# Patient Record
Sex: MALE | Race: BLACK OR AFRICAN AMERICAN | URBAN - METROPOLITAN AREA
[De-identification: names, ages, dates, MRNs, and addresses within clinical notes are randomized per-mention and may not be internally consistent; named-entity substitution may affect disease eponyms.]

---

## 2018-11-02 ENCOUNTER — HOSPITAL ENCOUNTER (OUTPATIENT)
Dept: GENERAL RADIOLOGY | Facility: CLINIC | Age: 33
Discharge: HOME | End: 2018-11-02
Attending: EMERGENCY MEDICINE | Admitting: EMERGENCY MEDICINE
Payer: MEDICAID

## 2018-11-02 ENCOUNTER — NURSE TRIAGE (OUTPATIENT)
Dept: NURSING | Facility: CLINIC | Age: 33
End: 2018-11-02

## 2018-11-02 ENCOUNTER — HOSPITAL ENCOUNTER (EMERGENCY)
Facility: CLINIC | Age: 33
Discharge: HOME | End: 2018-11-02
Attending: EMERGENCY MEDICINE | Admitting: EMERGENCY MEDICINE
Payer: MEDICAID

## 2018-11-02 VITALS
HEART RATE: 68 BPM | RESPIRATION RATE: 14 BRPM | OXYGEN SATURATION: 100 % | DIASTOLIC BLOOD PRESSURE: 58 MMHG | BODY MASS INDEX: 26.07 KG/M2 | TEMPERATURE: 97.9 F | SYSTOLIC BLOOD PRESSURE: 120 MMHG | WEIGHT: 188.49 LBS

## 2018-11-02 DIAGNOSIS — R50.9 FEVER, UNKNOWN ORIGIN: ICD-10-CM

## 2018-11-02 DIAGNOSIS — D72.819 LEUKOPENIA, UNSPECIFIED TYPE: ICD-10-CM

## 2018-11-02 DIAGNOSIS — D69.6 THROMBOCYTOPENIA (H): ICD-10-CM

## 2018-11-02 DIAGNOSIS — R05.9 COUGH: ICD-10-CM

## 2018-11-02 DIAGNOSIS — R11.11 VOMITING WITHOUT NAUSEA, INTRACTABILITY OF VOMITING NOT SPECIFIED, UNSPECIFIED VOMITING TYPE: Primary | ICD-10-CM

## 2018-11-02 LAB
ALBUMIN SERPL-MCNC: 3.5 G/DL (ref 3.4–5)
ALBUMIN UR-MCNC: NEGATIVE MG/DL
ALP SERPL-CCNC: 56 U/L (ref 40–150)
ALT SERPL W P-5'-P-CCNC: 35 U/L (ref 0–70)
ANION GAP SERPL CALCULATED.3IONS-SCNC: 6 MMOL/L (ref 3–14)
APPEARANCE CSF: CLEAR
APPEARANCE CSF: CLEAR
APPEARANCE UR: CLEAR
AST SERPL W P-5'-P-CCNC: 41 U/L (ref 0–45)
BASOPHILS # BLD AUTO: 0 10E9/L (ref 0–0.2)
BASOPHILS NFR BLD AUTO: 0.6 %
BILIRUB SERPL-MCNC: 0.4 MG/DL (ref 0.2–1.3)
BILIRUB UR QL STRIP: NEGATIVE
BUN SERPL-MCNC: 8 MG/DL (ref 7–30)
CALCIUM SERPL-MCNC: 8.1 MG/DL (ref 8.5–10.1)
CHLORIDE SERPL-SCNC: 97 MMOL/L (ref 94–109)
CO2 SERPL-SCNC: 28 MMOL/L (ref 20–32)
COLOR CSF: COLORLESS
COLOR CSF: COLORLESS
COLOR UR AUTO: YELLOW
CREAT SERPL-MCNC: 1.08 MG/DL (ref 0.66–1.25)
CRYPTOC AG SPEC QL: NORMAL
DIFFERENTIAL METHOD BLD: ABNORMAL
EOSINOPHIL # BLD AUTO: 0 10E9/L (ref 0–0.7)
EOSINOPHIL NFR BLD AUTO: 0 %
ERYTHROCYTE [DISTWIDTH] IN BLOOD BY AUTOMATED COUNT: 12.2 % (ref 10–15)
EV RNA SPEC QL NAA+PROBE: NEGATIVE
FLUAV+FLUBV AG SPEC QL: NEGATIVE
FLUAV+FLUBV AG SPEC QL: NEGATIVE
GFR SERPL CREATININE-BSD FRML MDRD: 81 ML/MIN/1.7M2
GLUCOSE CSF-MCNC: 65 MG/DL (ref 40–70)
GLUCOSE SERPL-MCNC: 113 MG/DL (ref 70–99)
GLUCOSE UR STRIP-MCNC: NEGATIVE MG/DL
GRAM STN SPEC: NORMAL
HCT VFR BLD AUTO: 39.4 % (ref 40–53)
HGB BLD-MCNC: 13.4 G/DL (ref 13.3–17.7)
HGB UR QL STRIP: NEGATIVE
IMM GRANULOCYTES # BLD: 0 10E9/L (ref 0–0.4)
IMM GRANULOCYTES NFR BLD: 0.6 %
KETONES UR STRIP-MCNC: NEGATIVE MG/DL
LACTATE BLD-SCNC: 1.3 MMOL/L (ref 0.7–2)
LEUKOCYTE ESTERASE UR QL STRIP: NEGATIVE
LIPASE SERPL-CCNC: 82 U/L (ref 73–393)
LYMPHOCYTES # BLD AUTO: 0.4 10E9/L (ref 0.8–5.3)
LYMPHOCYTES NFR BLD AUTO: 21.8 %
Lab: NORMAL
MCH RBC QN AUTO: 30.3 PG (ref 26.5–33)
MCHC RBC AUTO-ENTMCNC: 34 G/DL (ref 31.5–36.5)
MCV RBC AUTO: 89 FL (ref 78–100)
MONOCYTES # BLD AUTO: 0.2 10E9/L (ref 0–1.3)
MONOCYTES NFR BLD AUTO: 10.6 %
NEUTROPHILS # BLD AUTO: 1.2 10E9/L (ref 1.6–8.3)
NEUTROPHILS NFR BLD AUTO: 66.4 %
NITRATE UR QL: NEGATIVE
NRBC # BLD AUTO: 0 10*3/UL
NRBC BLD AUTO-RTO: 0 /100
PH UR STRIP: 6 PH (ref 5–7)
PLATELET # BLD AUTO: 86 10E9/L (ref 150–450)
POTASSIUM SERPL-SCNC: 3.4 MMOL/L (ref 3.4–5.3)
PROT CSF-MCNC: 30 MG/DL (ref 15–60)
PROT SERPL-MCNC: 7.1 G/DL (ref 6.8–8.8)
RBC # BLD AUTO: 4.42 10E12/L (ref 4.4–5.9)
RBC # CSF MANUAL: 1 /UL (ref 0–2)
RBC # CSF MANUAL: NORMAL /UL (ref 0–2)
SODIUM SERPL-SCNC: 131 MMOL/L (ref 133–144)
SOURCE: NORMAL
SP GR UR STRIP: 1.01 (ref 1–1.03)
SPECIMEN SOURCE: NORMAL
TUBE # CSF: 1 #
TUBE # CSF: 4 #
UROBILINOGEN UR STRIP-MCNC: 2 MG/DL (ref 0–2)
WBC # BLD AUTO: 1.8 10E9/L (ref 4–11)
WBC # CSF MANUAL: 0 /UL (ref 0–5)
WBC # CSF MANUAL: NORMAL /UL (ref 0–5)

## 2018-11-02 PROCEDURE — 96360 HYDRATION IV INFUSION INIT: CPT | Mod: 59 | Performed by: EMERGENCY MEDICINE

## 2018-11-02 PROCEDURE — 25000132 ZZH RX MED GY IP 250 OP 250 PS 637: Performed by: EMERGENCY MEDICINE

## 2018-11-02 PROCEDURE — 62270 DX LMBR SPI PNXR: CPT | Performed by: EMERGENCY MEDICINE

## 2018-11-02 PROCEDURE — 87205 SMEAR GRAM STAIN: CPT | Performed by: EMERGENCY MEDICINE

## 2018-11-02 PROCEDURE — 87806 HIV AG W/HIV1&2 ANTB W/OPTIC: CPT | Performed by: EMERGENCY MEDICINE

## 2018-11-02 PROCEDURE — 96361 HYDRATE IV INFUSION ADD-ON: CPT | Performed by: EMERGENCY MEDICINE

## 2018-11-02 PROCEDURE — 83605 ASSAY OF LACTIC ACID: CPT | Performed by: EMERGENCY MEDICINE

## 2018-11-02 PROCEDURE — 87804 INFLUENZA ASSAY W/OPTIC: CPT | Mod: 91 | Performed by: EMERGENCY MEDICINE

## 2018-11-02 PROCEDURE — 84157 ASSAY OF PROTEIN OTHER: CPT | Performed by: EMERGENCY MEDICINE

## 2018-11-02 PROCEDURE — 87389 HIV-1 AG W/HIV-1&-2 AB AG IA: CPT | Performed by: EMERGENCY MEDICINE

## 2018-11-02 PROCEDURE — 87015 SPECIMEN INFECT AGNT CONCNTJ: CPT | Performed by: EMERGENCY MEDICINE

## 2018-11-02 PROCEDURE — 85025 COMPLETE CBC W/AUTO DIFF WBC: CPT | Performed by: EMERGENCY MEDICINE

## 2018-11-02 PROCEDURE — 87798 DETECT AGENT NOS DNA AMP: CPT | Performed by: EMERGENCY MEDICINE

## 2018-11-02 PROCEDURE — 83690 ASSAY OF LIPASE: CPT | Performed by: EMERGENCY MEDICINE

## 2018-11-02 PROCEDURE — 88108 CYTOPATH CONCENTRATE TECH: CPT | Performed by: EMERGENCY MEDICINE

## 2018-11-02 PROCEDURE — 82945 GLUCOSE OTHER FLUID: CPT | Performed by: EMERGENCY MEDICINE

## 2018-11-02 PROCEDURE — 87899 AGENT NOS ASSAY W/OPTIC: CPT | Performed by: EMERGENCY MEDICINE

## 2018-11-02 PROCEDURE — 87040 BLOOD CULTURE FOR BACTERIA: CPT | Performed by: EMERGENCY MEDICINE

## 2018-11-02 PROCEDURE — 99285 EMERGENCY DEPT VISIT HI MDM: CPT | Mod: 25 | Performed by: EMERGENCY MEDICINE

## 2018-11-02 PROCEDURE — 00000102 ZZHCL STATISTIC CYTO WRIGHT STAIN TC: Performed by: EMERGENCY MEDICINE

## 2018-11-02 PROCEDURE — 36415 COLL VENOUS BLD VENIPUNCTURE: CPT | Performed by: EMERGENCY MEDICINE

## 2018-11-02 PROCEDURE — 71046 X-RAY EXAM CHEST 2 VIEWS: CPT

## 2018-11-02 PROCEDURE — 81003 URINALYSIS AUTO W/O SCOPE: CPT | Performed by: EMERGENCY MEDICINE

## 2018-11-02 PROCEDURE — 89050 BODY FLUID CELL COUNT: CPT | Performed by: EMERGENCY MEDICINE

## 2018-11-02 PROCEDURE — 87075 CULTR BACTERIA EXCEPT BLOOD: CPT | Performed by: EMERGENCY MEDICINE

## 2018-11-02 PROCEDURE — 99285 EMERGENCY DEPT VISIT HI MDM: CPT | Mod: Z6 | Performed by: EMERGENCY MEDICINE

## 2018-11-02 PROCEDURE — 87498 ENTEROVIRUS PROBE&REVRS TRNS: CPT | Performed by: EMERGENCY MEDICINE

## 2018-11-02 PROCEDURE — 25000128 H RX IP 250 OP 636: Performed by: EMERGENCY MEDICINE

## 2018-11-02 PROCEDURE — 87102 FUNGUS ISOLATION CULTURE: CPT | Performed by: EMERGENCY MEDICINE

## 2018-11-02 PROCEDURE — 87070 CULTURE OTHR SPECIMN AEROBIC: CPT | Performed by: EMERGENCY MEDICINE

## 2018-11-02 PROCEDURE — 80053 COMPREHEN METABOLIC PANEL: CPT | Performed by: EMERGENCY MEDICINE

## 2018-11-02 RX ORDER — IBUPROFEN 600 MG/1
600 TABLET, FILM COATED ORAL ONCE
Status: COMPLETED | OUTPATIENT
Start: 2018-11-02 | End: 2018-11-02

## 2018-11-02 RX ORDER — ACETAMINOPHEN 500 MG
1000 TABLET ORAL ONCE
Status: COMPLETED | OUTPATIENT
Start: 2018-11-02 | End: 2018-11-02

## 2018-11-02 RX ORDER — SODIUM CHLORIDE 9 MG/ML
1000 INJECTION, SOLUTION INTRAVENOUS CONTINUOUS
Status: DISCONTINUED | OUTPATIENT
Start: 2018-11-02 | End: 2018-11-02 | Stop reason: HOSPADM

## 2018-11-02 RX ORDER — ONDANSETRON 4 MG/1
4 TABLET, ORALLY DISINTEGRATING ORAL EVERY 8 HOURS PRN
Qty: 10 TABLET | Refills: 0 | Status: SHIPPED | OUTPATIENT
Start: 2018-11-02 | End: 2018-11-12

## 2018-11-02 RX ORDER — LIDOCAINE HYDROCHLORIDE AND EPINEPHRINE 10; 10 MG/ML; UG/ML
INJECTION, SOLUTION INFILTRATION; PERINEURAL
Status: DISCONTINUED
Start: 2018-11-02 | End: 2018-11-02 | Stop reason: HOSPADM

## 2018-11-02 RX ADMIN — SODIUM CHLORIDE 1000 ML: 9 INJECTION, SOLUTION INTRAVENOUS at 15:17

## 2018-11-02 RX ADMIN — ACETAMINOPHEN 1000 MG: 500 TABLET, FILM COATED ORAL at 15:15

## 2018-11-02 RX ADMIN — IBUPROFEN 600 MG: 600 TABLET ORAL at 18:20

## 2018-11-02 ASSESSMENT — ENCOUNTER SYMPTOMS
DYSURIA: 0
NAUSEA: 1
VOMITING: 1
ABDOMINAL PAIN: 1
BLOOD IN STOOL: 0
DIARRHEA: 0
COUGH: 0
SORE THROAT: 0
NECK PAIN: 1
HEADACHES: 1
MYALGIAS: 1
FEVER: 1

## 2018-11-02 NOTE — ED NOTES
Bed: IN04  Expected date: 18  Expected time: 1:55 PM  Means of arrival: Car  Comments:  Alley Murphy : 1990 with 1.5 weeks of fevers, vomiting. Low WBC and PLT.

## 2018-11-02 NOTE — ED TRIAGE NOTES
Pt comes from urgent care with 1.5 weeks of fevers, nausea, headache, poor oral intake, cough, and body aches. Labs revealed a low wbc and low plts. Alert and oriented. Mask applied in triage.

## 2018-11-02 NOTE — TELEPHONE ENCOUNTER
P didn't have any openings. I suggested the nurse practitoner clinic since he's not a [facility] patient.  Jennifer Sanabria RN-[facility]

## 2018-11-02 NOTE — ED AVS SNAPSHOT
[facility], Emergency Department    [address] [address]    Phone:  [phone]                                       Alley Murphy   MRN: [id number]    Department:  [facility], Emergency Department   Date of Visit:  11/2/2018           Patient Information     Date Of Birth          1/1/1990        Your diagnoses for this visit were:     Leukopenia, unspecified type     Thrombocytopenia (H)     Vomiting without nausea, intractability of vomiting not specified, unspecified vomiting type     Fever, unknown origin        You were seen by Sarkis Hoyt MD and Edna Ritter MD.        Discharge Instructions       Please make an appointment to follow up with Your Primary Care Provider and Infectious Disease Clinic (phone: [phone]) in 3-5 days even if entirely better to recheck your white blood cell count platelet count.      Discharge References/Attachments     VOMITING OR DIARRHEA (ADULT), DIET FOR (ENGLISH)    FEBRILE ILLNESS, UNCERTAIN CAUSE (ADULT) (ENGLISH)      Your next 10 appointments already scheduled     Nov 08, 2018 11:00 AM CST   Return Visit with Dewayne Ledezma PA-C   [facility] ORTHOPEDIC SURGERY ([facility])    [address]  [address] [place] [address]   [phone]              24 Hour Appointment Hotline       To make an appointment at any [facility], call [phone]). If you don't have a family doctor or clinic, we will help you find one. [place] clinics are conveniently located to serve the needs of you and your family.             Review of your medicines      START taking        Dose / Directions Last dose taken    ondansetron 4 MG ODT tab   Commonly known as:  ZOFRAN ODT   Dose:  4 mg   Quantity:  10 tablet        Take 1 tablet (4 mg) by mouth every 8 hours as needed   Refills:  0                Prescriptions were sent or printed at these locations (1 Prescription)                    Other Prescriptions                Printed at Department/Unit printer (1 of 1)         ondansetron (ZOFRAN ODT) 4 MG ODT tab                Procedures and tests performed during your visit     Procedure/Test Number of Times Performed    Anaerobic CSF culture 1    Blood culture 2    CBC with platelets differential 1    CSF Culture Aerobic Bacterial 1    Cell count with differential CSF: Tube 1 1    Cell count with differential CSF: Tube 4 1    Comprehensive metabolic panel 1    Cryptococcus antigen CSF Tube 2 1    Cytology non gyn Tube 4 1    Enterovirus PCR CSF 1    Fungus Culture, non-blood 1    Glucose CSF: Tube 1 1    Gram stain 1    HIV Antigen Antibody Combo 1    Influenza A/B antigen 1    Lactic acid whole blood 1    Lipase 1    Protein total CSF: Tube 1 1    Toxoplasma gondii by PCR CSF Tube 3 1    UA reflex to Microscopic and Culture 1    XR Chest 2 Views 1      Orders Needing Specimen Collection     None      Pending Results     Date and Time Order Name Status Description    11/2/2018 1701 HIV Antigen Antibody Combo In process     11/2/2018 1700 Enterovirus PCR CSF In process     11/2/2018 1700 Toxoplasma gondii by PCR CSF Tube 3 In process     11/2/2018 1700 Cytology non gyn Tube 4 In process     11/2/2018 1700 Fungus Culture, non-blood Preliminary     11/2/2018 1700 Anaerobic CSF culture Preliminary     11/2/2018 1700 Cell count with differential CSF: Tube 4 In process     11/2/2018 1700 CSF Culture Aerobic Bacterial Preliminary     11/2/2018 1418 Blood culture In process     11/2/2018 1418 Blood culture Preliminary             Pending Culture Results     Date and Time Order Name Status Description    11/2/2018 1700 Enterovirus PCR CSF In process     11/2/2018 1700 Fungus Culture, non-blood Preliminary     11/2/2018 1700 Anaerobic CSF culture Preliminary     11/2/2018 1700 Cell count with differential CSF: Tube 4 In process     11/2/2018 1700 CSF Culture Aerobic Bacterial Preliminary     11/2/2018 1418  Blood culture In process     11/2/2018 1418 Blood culture Preliminary             Pending Results Instructions     If you had any lab results that were not finalized at the time of your Discharge, you can call the ED Lab Result RN at [phone]. You will be contacted by this team for any positive Lab results or changes in treatment. The nurses are available 7 days a week from 10A to 6:30P.  You can leave a message 24 hours per day and they will return your call.        Thank you for choosing [facility]       Thank you for choosing [facility] for your care. Our goal is always to provide you with excellent care. Hearing back from our patients is one way we can continue to improve our services. Please take a few minutes to complete the written survey that you may receive in the mail after you visit with us. Thank you!        Jewel TonedharLust have it! Information     [facility] gives you secure access to your electronic health record. If you see a primary care provider, you can also send messages to your care team and make appointments. If you have questions, please call your primary care clinic.  If you do not have a primary care provider, please call [phone] and they will assist you.        Care EveryWhere ID     This is your Care EveryWhere ID. This could be used by other organizations to access your [facility] medical records  [id number]        Equal Access to Services     TRENA GALLARDO : Tiffany Sanchez, wavamsi sands, qashannonta kaalmamonico bender, dion hyde. So [facility] [phone].    ATENCIÓN: Si habla español, tiene a nice disposición servicios gratuitos de asistencia lingüística. Llame al [phone].    We comply with applicable federal civil rights laws and [place] laws. We do not discriminate on the basis of race, color, national origin, age, disability, sex, sexual orientation, or gender identity.            After Visit Summary       This is your record. Keep this with you and show  to your community pharmacist(s) and doctor(s) at your next visit.

## 2018-11-02 NOTE — ED AVS SNAPSHOT
[facility], [place], Emergency Department    [address] [address]    Phone:  [phone]                                       Alley Murphy   MRN: [id number]    Department:  [facility], Emergency Department   Date of Visit:  11/2/2018           After Visit Summary Signature Page     I have received my discharge instructions, and my questions have been answered. I have discussed any challenges I see with this plan with the nurse or doctor.    ..........................................................................................................................................  Patient/Patient Representative Signature      ..........................................................................................................................................  Patient Representative Print Name and Relationship to Patient    ..................................................               ................................................  Date                                   Time    ..........................................................................................................................................  Reviewed by Signature/Title    ...................................................              ..............................................  Date                                               Time          22EPIC Rev 08/18

## 2018-11-03 LAB
BACTERIA SPEC CULT: NORMAL
Lab: NORMAL
SPECIMEN SOURCE: NORMAL

## 2018-11-03 NOTE — DISCHARGE INSTRUCTIONS
Please make an appointment to follow up with Your Primary Care Provider and Infectious Disease Clinic (phone: [phone]) in 3-5 days even if entirely better to recheck your white blood cell count platelet count.

## 2018-11-03 NOTE — ED NOTES
Patient was signed out to me by Dr. Hoyt awaiting CSF studies, reassessment and disposition.  Patient's CSF returned with normal glucose and protein and undetectable WBCs.  Gram stain is negative, not showing any organisms.  The rest of the laboratory studies show no obvious signs of infection; however, patient does have leukopenia and thrombocytopenia.  I suspect that this could likely be due to some type of viral illness.  There are other CSF studies that are pending which will not result today.  He was referred to his PCP and Infectious Disease clinics for further evaluation.  He agrees with this plan.  He is hemodynamically stable.    This part of the medical record was transcribed by Faheem Ma, Medical Scribe, from a dictation done by Kevin Ritter MD.     I was physically present and have reviewed and verified the accuracy of this note documented by my scribe.    Kevin Ritter MD  November 2, 2018      Edna Ritter MD  11/03/18 0007

## 2018-11-05 ENCOUNTER — HOSPITAL ENCOUNTER (EMERGENCY)
Facility: CLINIC | Age: 33
Discharge: HOME | End: 2018-11-05
Attending: EMERGENCY MEDICINE | Admitting: EMERGENCY MEDICINE
Payer: MEDICAID

## 2018-11-05 ENCOUNTER — NURSE TRIAGE (OUTPATIENT)
Dept: NURSING | Facility: CLINIC | Age: 33
End: 2018-11-05

## 2018-11-05 ENCOUNTER — TRANSFERRED RECORDS (OUTPATIENT)
Dept: EMERGENCY MEDICINE | Facility: CLINIC | Age: 33
End: 2018-11-05

## 2018-11-05 VITALS
TEMPERATURE: 98 F | HEART RATE: 68 BPM | BODY MASS INDEX: 23.1 KG/M2 | RESPIRATION RATE: 14 BRPM | HEIGHT: 74 IN | SYSTOLIC BLOOD PRESSURE: 118 MMHG | DIASTOLIC BLOOD PRESSURE: 62 MMHG | OXYGEN SATURATION: 98 % | WEIGHT: 180 LBS

## 2018-11-05 DIAGNOSIS — G97.1 SPINAL HEADACHE: ICD-10-CM

## 2018-11-05 LAB
ANION GAP SERPL CALCULATED.3IONS-SCNC: 6 MMOL/L (ref 3–14)
BASOPHILS # BLD AUTO: 0 10E9/L (ref 0–0.2)
BASOPHILS NFR BLD AUTO: 0 %
BUN SERPL-MCNC: 10 MG/DL (ref 7–30)
CALCIUM SERPL-MCNC: 8.3 MG/DL (ref 8.5–10.1)
CHLORIDE SERPL-SCNC: 104 MMOL/L (ref 94–109)
CO2 SERPL-SCNC: 27 MMOL/L (ref 20–32)
COPATH REPORT: NORMAL
CREAT SERPL-MCNC: 0.85 MG/DL (ref 0.66–1.25)
CRP SERPL-MCNC: <2.9 MG/L (ref 0–8)
DIFFERENTIAL METHOD BLD: ABNORMAL
EOSINOPHIL # BLD AUTO: 0.4 10E9/L (ref 0–0.7)
EOSINOPHIL NFR BLD AUTO: 9 %
ERYTHROCYTE [DISTWIDTH] IN BLOOD BY AUTOMATED COUNT: 12.6 % (ref 10–15)
ERYTHROCYTE [SEDIMENTATION RATE] IN BLOOD BY WESTERGREN METHOD: 9 MM/H (ref 0–15)
GFR SERPL CREATININE-BSD FRML MDRD: >90 ML/MIN/1.7M2
GLUCOSE SERPL-MCNC: 90 MG/DL (ref 70–99)
HCT VFR BLD AUTO: 46.5 % (ref 40–53)
HGB BLD-MCNC: 15.9 G/DL (ref 13.3–17.7)
HIV 1+2 AB+HIV1 P24 AG SERPL QL IA: NONREACTIVE
LYMPHOCYTES # BLD AUTO: 1.4 10E9/L (ref 0.8–5.3)
LYMPHOCYTES NFR BLD AUTO: 34 %
MCH RBC QN AUTO: 31.3 PG (ref 26.5–33)
MCHC RBC AUTO-ENTMCNC: 34.2 G/DL (ref 31.5–36.5)
MCV RBC AUTO: 92 FL (ref 78–100)
MONOCYTES # BLD AUTO: 0.8 10E9/L (ref 0–1.3)
MONOCYTES NFR BLD AUTO: 19 %
NEUTROPHILS # BLD AUTO: 1.6 10E9/L (ref 1.6–8.3)
NEUTROPHILS NFR BLD AUTO: 38 %
PLATELET # BLD AUTO: 53 10E9/L (ref 150–450)
POTASSIUM SERPL-SCNC: 4 MMOL/L (ref 3.4–5.3)
RBC # BLD AUTO: 5.08 10E12/L (ref 4.4–5.9)
RBC MORPH BLD: NORMAL
SODIUM SERPL-SCNC: 137 MMOL/L (ref 133–144)
WBC # BLD AUTO: 4.2 10E9/L (ref 4–11)

## 2018-11-05 PROCEDURE — 96360 HYDRATION IV INFUSION INIT: CPT | Performed by: EMERGENCY MEDICINE

## 2018-11-05 PROCEDURE — 85652 RBC SED RATE AUTOMATED: CPT | Performed by: EMERGENCY MEDICINE

## 2018-11-05 PROCEDURE — 86140 C-REACTIVE PROTEIN: CPT | Performed by: EMERGENCY MEDICINE

## 2018-11-05 PROCEDURE — 80048 BASIC METABOLIC PNL TOTAL CA: CPT | Performed by: EMERGENCY MEDICINE

## 2018-11-05 PROCEDURE — 99284 EMERGENCY DEPT VISIT MOD MDM: CPT | Mod: 25 | Performed by: EMERGENCY MEDICINE

## 2018-11-05 PROCEDURE — 85025 COMPLETE CBC W/AUTO DIFF WBC: CPT | Performed by: EMERGENCY MEDICINE

## 2018-11-05 PROCEDURE — 99284 EMERGENCY DEPT VISIT MOD MDM: CPT | Mod: Z6 | Performed by: EMERGENCY MEDICINE

## 2018-11-05 PROCEDURE — 25000128 H RX IP 250 OP 636: Performed by: EMERGENCY MEDICINE

## 2018-11-05 PROCEDURE — 40000556 ZZH STATISTIC PERIPHERAL IV START W US GUIDANCE

## 2018-11-05 RX ORDER — TRAMADOL HYDROCHLORIDE 50 MG/1
50-100 TABLET ORAL EVERY 6 HOURS PRN
Qty: 10 TABLET | Refills: 0 | Status: SHIPPED | OUTPATIENT
Start: 2018-11-05 | End: 2018-11-12

## 2018-11-05 RX ADMIN — SODIUM CHLORIDE 1000 ML: 9 INJECTION, SOLUTION INTRAVENOUS at 15:30

## 2018-11-05 NOTE — TELEPHONE ENCOUNTER
FNA Triage Call  Presenting Problem:From [phone] Pt called.  Seen on 11/2/18 for vomiting diarrhea and fever  @  ED .   Had a   Spinal tap done on 11/2/18 at  [facility]  ED , and since then is  having severe headache is 10/10  now without fever , Headache  is relieved with laying down and worse when up .  Last vomited 48 hours ,  but nausea persists   and last voided at 8am and has saliva , but ll drinking water and eating now  .   Conferenced  [facility]  ED RN Toi  [phone] to advise sending Pt in  .   Guideline Used:no guideline - adult   Patient Recommendations/Teaching: have someone return you to  of [place] now and Pt agrees.   Verbalizes understanding and denies further questions .  Nina Castañeda RN  - [facility] Nurse [facility]        Reason for Disposition    Nursing judgment    Additional Information    Negative: Nursing judgment    Negative: Information only call; adult is not ill or injured    Protocols used: NO GUIDELINE OR REFERENCE AVAILABLE-ADULT-

## 2018-11-05 NOTE — ED TRIAGE NOTES
Arrived to ED d/t headache, was seen on 11/2 and had a spinal tap, now c/o 10/10 headache with positioning, VSS, afebrile

## 2018-11-05 NOTE — ED AVS SNAPSHOT
[facility], Emergency Department    [address] [address]    Phone:  [phone]                                       Alley Murphy   MRN: [id number]    Department:  [facility], Emergency Department   Date of Visit:  11/5/2018           Patient Information     Date Of Birth          1/1/1990        Your diagnoses for this visit were:     Spinal headache        You were seen by Jeevan Barrow MD.        Discharge Instructions       Please make an appointment to follow up with Your Primary Care Provider or our [facility] (phone: [phone]) in one week for recheck unless symptoms completely resolve.    Return to the ER for worsening or fever.    Discharge References/Attachments     HEADACHE AFTER SPINAL TAP (NO PATCH) (ENGLISH)      Your next 10 appointments already scheduled     Nov 08, 2018 11:00 AM CST   Return Visit with Dewayne Ledezma PA-C   [facility] ORTHOPEDIC SURGERY ([facility] [place])    [address]  [address]  [facility] [address]   [phone]              24 Hour Appointment Hotline       To make an appointment at any [facility], call [phone] ([phone]). If you don't have a family doctor or clinic, we will help you find one. [facility] clinics are conveniently located to serve the needs of you and your family.             Review of your medicines      START taking        Dose / Directions Last dose taken    traMADol 50 MG tablet   Commonly known as:  ULTRAM   Dose:   mg   Quantity:  10 tablet        Take 1-2 tablets ( mg) by mouth every 6 hours as needed for headaches   Refills:  0          Our records show that you are taking the medicines listed below. If these are incorrect, please call your family doctor or clinic.        Dose / Directions Last dose taken    ondansetron 4 MG ODT tab   Commonly known as:  ZOFRAN ODT   Dose:  4 mg   Quantity:  10 tablet        Take 1 tablet (4  mg) by mouth every 8 hours as needed   Refills:  0                Information about OPIOIDS     PRESCRIPTION OPIOIDS: WHAT YOU NEED TO KNOW   We gave you an opioid (narcotic) pain medicine. It is important to manage your pain, but opioids are not always the best choice. You should first try all the other options your care team gave you. Take this medicine for as short a time (and as few doses) as possible.    Some activities can increase your pain, such as bandage changes or therapy sessions. It may help to take your pain medicine 30 to 60 minutes before these activities. Reduce your stress by getting enough sleep, working on hobbies you enjoy and practicing relaxation or meditation. Talk to your care team about ways to manage your pain beyond prescription opioids.    These medicines have risks:    DO NOT drive when on new or higher doses of pain medicine. These medicines can affect your alertness and reaction times, and you could be arrested for driving under the influence (DUI). If you need to use opioids long-term, talk to your care team about driving.    DO NOT operate heavy machinery    DO NOT do any other dangerous activities while taking these medicines.    DO NOT drink any alcohol while taking these medicines.     If the opioid prescribed includes acetaminophen, DO NOT take with any other medicines that contain acetaminophen. Read all labels carefully. Look for the word  acetaminophen  or  Tylenol.  Ask your pharmacist if you have questions or are unsure.    You can get addicted to pain medicines, especially if you have a history of addiction (chemical, alcohol or substance dependence). Talk to your care team about ways to reduce this risk.    All opioids tend to cause constipation. Drink plenty of water and eat foods that have a lot of fiber, such as fruits, vegetables, prune juice, apple juice and high-fiber cereal. Take a laxative (Miralax, milk of magnesia, Colace, Senna) if you don t move your bowels at  least every other day. Other side effects include upset stomach, sleepiness, dizziness, throwing up, tolerance (needing more of the medicine to have the same effect), physical dependence and slowed breathing.    Store your pills in a secure place, locked if possible. We will not replace any lost or stolen medicine. If you don t finish your medicine, please throw away (dispose) as directed by your pharmacist. The [place] [employer] has more information about safe disposal: https://www.Idea[employer].[facility].[place].us/living-green/managing-unwanted-medications        Prescriptions were sent or printed at these locations (1 Prescription)                   Other Prescriptions                Printed at Department/Unit printer (1 of 1)         traMADol (ULTRAM) 50 MG tablet                Procedures and tests performed during your visit     Procedure/Test Number of Times Performed    Basic metabolic panel 1    CBC with platelets differential 1    CRP inflammation 1    Erythrocyte sedimentation rate auto 1    Peripheral IV: Standard 1    Vascular Access Care Adult IP Consult 2      Orders Needing Specimen Collection     None      Pending Results     No orders found from 11/3/2018 to 11/6/2018.            Pending Culture Results     No orders found from 11/3/2018 to 11/6/2018.            Pending Results Instructions     If you had any lab results that were not finalized at the time of your Discharge, you can call the ED Lab Result RN at [phone]. You will be contacted by this team for any positive Lab results or changes in treatment. The nurses are available 7 days a week from 10A to 6:30P.  You can leave a message 24 hours per day and they will return your call.        Thank you for choosing [facility]       Thank you for choosing [facility] for your care. Our goal is always to provide you with excellent care. Hearing back from our patients is one way we can continue to improve our services. Please take a few minutes to  complete the written survey that you may receive in the mail after you visit with us. Thank you!        zkipster[employer] value Information     [facility] gives you secure access to your electronic health record. If you see a primary care provider, you can also send messages to your care team and make appointments. If you have questions, please call your primary care clinic.  If you do not have a primary care provider, please call [phone] and they will assist you.        Care EveryWhere ID     This is your Care EveryWhere ID. This could be used by other organizations to access your [facility] medical records  [id number]        Equal Access to Services     LOUIS GALLARDO : Tiffany Sanchez, radha sands, dion mohamud. So [facility] [phone].    ATENCIÓN: Si habla español, tiene a nice disposición servicios gratuitos de asistencia lingüística. Llame al [phone].    We comply with applicable federal civil rights laws and [place] laws. We do not discriminate on the basis of race, color, national origin, age, disability, sex, sexual orientation, or gender identity.            After Visit Summary       This is your record. Keep this with you and show to your community pharmacist(s) and doctor(s) at your next visit.

## 2018-11-05 NOTE — ED NOTES
Bed: IN01  Expected date: 18  Expected time:   Means of arrival: Car  Comments:  Wilfred Murphy   89  MRN [id number]  Seen on 2018 and had a spinal tap, now c/o severe headache 10/10

## 2018-11-05 NOTE — TELEPHONE ENCOUNTER
"Sibling calling on behalf of patient. 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 Reporting brother had spinal tap Friday evening 11/2/18. Stating patient has had a \"spinal headache\" since. Patient has tried caffeine and Tylenol with no improvement. Reporting pain improves at rest and increases with position changes.   Requested to speak with patient to complete triage. Patient is not present. Stating he is sleeping. Offered to wait and have caller wake patient. Stating she prefers to have patient call back directly for triage.     Caller verbalized understanding. Denies further questions.    Kirstin Russo RN  [facility]        "

## 2018-11-05 NOTE — ED AVS SNAPSHOT
[facility], [place], Emergency Department    [address] [address]    Phone:  [phone]                                       Alley Murphy   MRN: [id number]    Department:  [facility], Emergency Department   Date of Visit:  11/5/2018           After Visit Summary Signature Page     I have received my discharge instructions, and my questions have been answered. I have discussed any challenges I see with this plan with the nurse or doctor.    ..........................................................................................................................................  Patient/Patient Representative Signature      ..........................................................................................................................................  Patient Representative Print Name and Relationship to Patient    ..................................................               ................................................  Date                                   Time    ..........................................................................................................................................  Reviewed by Signature/Title    ...................................................              ..............................................  Date                                               Time          22EPIC Rev 08/18

## 2018-11-05 NOTE — ED PROVIDER NOTES
[place] EMERGENCY DEPARTMENT ([facility])  November 5, 2018    History     Chief Complaint   Patient presents with     Headache     HPI  Alley Murphy is a 28 year old male who presents to the ER with complaints of a frontal headache.  Patient states that last week, he was seen at [facility] for persistent nausea and vomiting associated with headache.  Patient states that he recently got back from [place], and after returning, he developed a flulike illness with nausea, vomiting, diarrhea,and myalgias and headache.  Patient states he did not get better and was seen at [facility] where he had IV fluids and was treated for a viral illness.  Patient states that he subsequently was seen, 3 days ago, in our Emergency Department when his headaches became worse associated with fevers to 103  F.  The patient underwent laboratory evaluation and a spinal tap which revealed unremarkable cervical spinal fluid but a white count on his CBC of 1.8 and platelets of 86,000.  The patient's hemoglobin at the time was 13.4.  The patient had no head CT done and was sent home after a negative spinal tap.  The patient now returns, stating that his temperature and headache have resolved, but over the weekend, he developed a new frontal headache that was worsened by sitting up.  Because the patient's significant other is a nurse, she became concerned about a spinal headache and brought him back to the ER for evaluation.  Patient states that this time he has no fever. He also states he has no headache, resting comfortably on the cart in the ER.  Patient denies any focal numbness, weakness, persistent vomiting, or diarrhea.    This part of the medical record was transcribed by Tavo Mcphersonibjanene, from a dictation done by Jeevan Barrow MD.     PAST MEDICAL HISTORY  Past Medical History:   Diagnosis Date     Flu 3/2016     PAST SURGICAL HISTORY  Past Surgical History:   Procedure Laterality Date     HAND SURGERY  "Right 04/19/2017    Procedure: Right Metacarpal ORIF - 4-5th fractures, 4th fixed with plate. Surgeon:  Thanh Mcdaniels MD  Location: [facility]     FAMILY HISTORY  Family History   Problem Relation Age of Onset     Diabetes Maternal Grandfather      SOCIAL HISTORY  Social History   Substance Use Topics     Smoking status: Former Smoker     Quit date: 4/11/2017     Smokeless tobacco: Never Used     Alcohol use No      Comment: none anymore     MEDICATIONS  Previous Medications    ONDANSETRON (ZOFRAN ODT) 4 MG ODT TAB    Take 1 tablet (4 mg) by mouth every 8 hours as needed     ALLERGIES  No Known Allergies    I have reviewed the Medications, Allergies, Past Medical and Surgical History, and Social History in the Epic system.    Review of Systems   All other systems reviewed and are negative.      Physical Exam   BP: 111/63  Pulse: 68  Temp: 98  F (36.7  C)  Resp: 14  Height: 188 cm (6' 2\")  Weight: 81.6 kg (180 lb)  SpO2: 100 %      Physical Exam   Constitutional: He is oriented to person, place, and time.   Sitting upright comfortable alert cooperative and pain-free   HENT:   Head: Atraumatic.   Eyes: EOM are normal. Pupils are equal, round, and reactive to light.   Neck: Neck supple.   Cardiovascular: Normal heart sounds.    Pulmonary/Chest: Breath sounds normal.   Abdominal: Soft.   Musculoskeletal: He exhibits no edema or deformity.   Neurological: He is alert and oriented to person, place, and time. No cranial nerve deficit.   Grossly intact and symmetric   Skin: No rash noted.   Psychiatric: He has a normal mood and affect.       ED Course     ED Course     Procedures        Results for orders placed or performed during the hospital encounter of 11/05/18   Basic metabolic panel   Result Value Ref Range    Sodium 137 133 - 144 mmol/L    Potassium 4.0 3.4 - 5.3 mmol/L    Chloride 104 94 - 109 mmol/L    Carbon Dioxide 27 20 - 32 mmol/L    Anion Gap 6 3 - 14 mmol/L    Glucose 90 70 - 99 mg/dL    " Urea Nitrogen 10 7 - 30 mg/dL    Creatinine 0.85 0.66 - 1.25 mg/dL    GFR Estimate >90 >60 mL/min/1.7m2    GFR Estimate If Black >90 >60 mL/min/1.7m2    Calcium 8.3 (L) 8.5 - 10.1 mg/dL   CBC with platelets differential   Result Value Ref Range    WBC 4.2 4.0 - 11.0 10e9/L    RBC Count 5.08 4.4 - 5.9 10e12/L    Hemoglobin 15.9 13.3 - 17.7 g/dL    Hematocrit 46.5 40.0 - 53.0 %    MCV 92 78 - 100 fl    MCH 31.3 26.5 - 33.0 pg    MCHC 34.2 31.5 - 36.5 g/dL    RDW 12.6 10.0 - 15.0 %    Platelet Count 53 (L) 150 - 450 10e9/L    Diff Method Manual Differential     % Neutrophils 38.0 %    % Lymphocytes 34.0 %    % Monocytes 19.0 %    % Eosinophils 9.0 %    % Basophils 0.0 %    Absolute Neutrophil 1.6 1.6 - 8.3 10e9/L    Absolute Lymphocytes 1.4 0.8 - 5.3 10e9/L    Absolute Monocytes 0.8 0.0 - 1.3 10e9/L    Absolute Eosinophils 0.4 0.0 - 0.7 10e9/L    Absolute Basophils 0.0 0.0 - 0.2 10e9/L    RBC Morphology Normal    Erythrocyte sedimentation rate auto   Result Value Ref Range    Sed Rate 9 0 - 15 mm/h   CRP inflammation   Result Value Ref Range    CRP Inflammation <2.9 0.0 - 8.0 mg/L       Assessments & Plan (with Medical Decision Making)     I have reviewed the nursing notes.    Medications   0.9% sodium chloride BOLUS (0 mLs Intravenous Stopped 11/5/18 6496)     Patient was given 1 L of fluid here in the ER per his request intravenously.  Laboratory evaluation revealed that his white count is rebounding back after his presumed viral illness.  Patient is currently asymptomatic and feels good and I do not believe a blood patch is necessary at this time.  The patient will be given a few Ultram tablets should his headache return.    I have reviewed the findings, diagnosis, plan and need for follow up with the patient and his wife.    New Prescriptions    TRAMADOL (ULTRAM) 50 MG TABLET    Take 1-2 tablets ( mg) by mouth every 6 hours as needed for headaches       Final diagnoses:   Spinal headache     Please make an  appointment to follow up with Your Primary Care Provider or our [facility] (phone: [phone]) in one week for recheck unless symptoms completely resolve.    Return to the ER for worsening or fever.    Routine discharge instructions were given for this diagnosis    Jeevan Barrow MD    11/5/2018   [facility], EMERGENCY DEPARTMENT     Jeevan Barrow MD  11/05/18 5031

## 2018-11-07 LAB
BACTERIA SPEC CULT: NO GROWTH
Lab: NORMAL
SPECIMEN SOURCE: NORMAL
SPECIMEN SOURCE: NORMAL
T GONDII DNA SPEC QL NAA+PROBE: NOT DETECTED

## 2018-11-08 LAB
BACTERIA SPEC CULT: NO GROWTH
BACTERIA SPEC CULT: NO GROWTH
Lab: NORMAL
Lab: NORMAL
SPECIMEN SOURCE: NORMAL
SPECIMEN SOURCE: NORMAL

## 2018-11-09 LAB
FUNGUS SPEC CULT: NORMAL
Lab: NORMAL
SPECIMEN SOURCE: NORMAL

## 2020-05-25 NOTE — ED PROVIDER NOTES
History     Chief Complaint   Patient presents with     Fever     Abnormal Labs     HPI  Allye Murphy is a 28 year old male who presenting to the Emergency Department for the evaluation of fever and abnormal labs. Patient complains of fever (102.3 here in ED), body aches, nausea, vomiting. No headache. Patient states that he is unable to keep food down and reports vomiting twice today and twice yesterday. Denies blood in emesis, denies diarrhea or sore throat. He states his fever is on and off and is reduced by Tylenol. Patient reports some abdominal pain and neck soreness and states that prior to current symptoms, he had a cough and cold. Patient denies any recent travel, drug use, owns pets, near other ill people, or known medical problems. Patient denies dysuria, penile discharge, or unprotected sex. He states he was vaccinated as a child. Patient works as a technician.     Patient went to urgent care today and was found to be leukopenia and thrombocytopenia and was sent to the ED for suspicion of leukemia or viral disease.    I have reviewed the Medications, Allergies, Past Medical and Surgical History, and Social History in the [employer] system.  Past Medical History:   Diagnosis Date     Flu 3/2016       Past Surgical History:   Procedure Laterality Date     HAND SURGERY Right 04/19/2017    Procedure: Right Metacarpal ORIF - 4-5th fractures, 4th fixed with plate. Surgeon:  Thanh Mcdaniels MD  Location: [facility]       Family History   Problem Relation Age of Onset     Diabetes Maternal Grandfather        Social History   Substance Use Topics     Smoking status: Former Smoker     Quit date: 4/11/2017     Smokeless tobacco: Never Used     Alcohol use No      Comment: none anymore       Current Facility-Administered Medications   Medication     0.9% sodium chloride BOLUS    Followed by     sodium chloride 0.9% infusion     No current outpatient prescriptions on file.      No Known  Problem: At Risk for Falls  Goal: # Patient does not fall  Outcome: Outcome Met, Continue evaluating goal progress toward completion  Patient remains free from falls. Bed in lowest position, bed rails up x2 and bed alarm on. Patient belongings and call light within reach. Nursing to continue to round q2h overnight and assess needs.     Problem: Breathing Pattern Ineffective  Goal: Air exchange is effective, demonstrated by Sp02 sat of greater then or = 92% (or as ordered)  Outcome: Outcome Met, Continue evaluating goal progress toward completion  Air exchange is effective as evidence by SpO2 of 96% Patient on continuous pulse oximetry. Nursing to continue to monitor.        Allergies    Review of Systems   Constitutional: Positive for fever (102.3).   HENT: Negative for sore throat.    Respiratory: Negative for cough.    Gastrointestinal: Positive for abdominal pain, nausea and vomiting. Negative for blood in stool and diarrhea.   Genitourinary: Negative for discharge and dysuria.   Musculoskeletal: Positive for myalgias and neck pain.   Neurological: Positive for headaches.       Physical Exam   BP: 113/71  Heart Rate: 79  Temp: 102.3  F (39.1  C)  Resp: 16  Weight: 85.5 kg (188 lb 7.9 oz)  SpO2: 99 %      Physical Exam  Physical Exam   Constitutional: oriented to person, place, and time. appears well-developed and well-nourished.   HENT:   Head: Normocephalic and atraumatic.  Intraoral mucosa without lesions.  Posterior pharynx without Swelling, purulence or erythema.  Neck: Normal range of motion.   Supple neck.  Posterior lymphadenopathy of the cervical spine on the right.  Pulmonary/Chest: Effort normal. No respiratory distress.   Cardiac: No murmurs, rubs, gallops. RRR.  Abdominal: Abdomen soft, nontender, nondistended. No rebound tenderness.  No right lower quadrant tenderness palpation.  MSK: Long bones without deformity or evidence of trauma  Neurological: alert and oriented to person, place, and time.   Skin: Skin is warm and dry.   Note no rashes seen.     psychiatric:  normal mood and affect.  behavior is normal. Thought content normal.     ED Course   2:12 PM  The patient was seen and examined by Sarkis Hoyt MD in Room 12.     ED Course     Procedures        Results for orders placed or performed during the hospital encounter of 11/02/18   XR Chest 2 Views    Narrative    Exam: XR CHEST 2 VW, 11/2/2018 3:24 PM    Indication: cough     Comparison: 3/30/2016    Findings:   PA and lateral views of the chest. Normal lung volumes. Cardiac  silhouette and pulmonary vasculature are within normal limits. No  focal opacity, pleural effusion, or pneumothorax. Upper abdomen  is  unremarkable. No displaced rib fracture.      Impression    Impression: No focal airspace disease.    I have personally reviewed the examination and initial interpretation  and I agree with the findings.    DAFNE JASSO MD   CBC with platelets differential   Result Value Ref Range    WBC 1.8 (L) 4.0 - 11.0 10e9/L    RBC Count 4.42 4.4 - 5.9 10e12/L    Hemoglobin 13.4 13.3 - 17.7 g/dL    Hematocrit 39.4 (L) 40.0 - 53.0 %    MCV 89 78 - 100 fl    MCH 30.3 26.5 - 33.0 pg    MCHC 34.0 31.5 - 36.5 g/dL    RDW 12.2 10.0 - 15.0 %    Platelet Count 86 (L) 150 - 450 10e9/L    Diff Method Automated Method     % Neutrophils 66.4 %    % Lymphocytes 21.8 %    % Monocytes 10.6 %    % Eosinophils 0.0 %    % Basophils 0.6 %    % Immature Granulocytes 0.6 %    Nucleated RBCs 0 0 /100    Absolute Neutrophil 1.2 (L) 1.6 - 8.3 10e9/L    Absolute Lymphocytes 0.4 (L) 0.8 - 5.3 10e9/L    Absolute Monocytes 0.2 0.0 - 1.3 10e9/L    Absolute Eosinophils 0.0 0.0 - 0.7 10e9/L    Absolute Basophils 0.0 0.0 - 0.2 10e9/L    Abs Immature Granulocytes 0.0 0 - 0.4 10e9/L    Absolute Nucleated RBC 0.0    Comprehensive metabolic panel   Result Value Ref Range    Sodium 131 (L) 133 - 144 mmol/L    Potassium 3.4 3.4 - 5.3 mmol/L    Chloride 97 94 - 109 mmol/L    Carbon Dioxide 28 20 - 32 mmol/L    Anion Gap 6 3 - 14 mmol/L    Glucose 113 (H) 70 - 99 mg/dL    Urea Nitrogen 8 7 - 30 mg/dL    Creatinine 1.08 0.66 - 1.25 mg/dL    GFR Estimate 81 >60 mL/min/1.7m2    GFR Estimate If Black >90 >60 mL/min/1.7m2    Calcium 8.1 (L) 8.5 - 10.1 mg/dL    Bilirubin Total 0.4 0.2 - 1.3 mg/dL    Albumin 3.5 3.4 - 5.0 g/dL    Protein Total 7.1 6.8 - 8.8 g/dL    Alkaline Phosphatase 56 40 - 150 U/L    ALT 35 0 - 70 U/L    AST 41 0 - 45 U/L   Lactic acid whole blood   Result Value Ref Range    Lactic Acid 1.3 0.7 - 2.0 mmol/L   Lipase   Result Value Ref Range    Lipase 82 73 - 393 U/L   Blood culture   Result Value Ref Range    Specimen Description Blood Left Arm      Special Requests Received in aerobic bottle only     Culture Micro PENDING             Assessments & Plan (with Medical Decision Making)   MDM  Patient presenting with fever and viral-like illness.  Here patient does have fever, no tachycardia, blood pressure normal.  Patient has had multiple viral-like symptoms, will get blood cultures, basic labs in addition to influenza test and HIV test.  Has not traveled recently so less likely malaria or tolerated illness.    Re eval: Chest x-ray is unremarkable.  Patient initially will not get urinalysis due to the fact that he recently gave 1, will encourage him to do this.  He is also not getting a influenza swab because he recently had a negative one.  Will encourage another 1 of this to.  I did discuss with infectious disease who recommended lumbar puncture, this was done and studies were sent.  If normal cytology, very low likelihood of meningitis due to lack of meningismus, significant neck stiffness or headaches.  Patient would like to go home regardless and says that he will return.  Encouraged him to stay for studies.  CBC shows low white blood count 1.8, platelets of 86, BMP normal.  Lactate is negative.  Chest x-ray does not show acute findings. Influenza is negative. Initial CSF results encouraging. Pending cytology. Patient signed out to oncoming physician pending cytology. I do feel that this patient is well appearing and safe to be discharged. On discussion with ID, ok to have CBC rechecked and that this is likely from viral process. Pt in no need of plts, no active bleeding and still over 50k. Patient agrees with plan.    I have reviewed the nursing notes.    I have reviewed the findings, diagnosis, plan and need for follow up with the patient.    New Prescriptions    No medications on file       Final diagnoses:   Leukopenia, unspecified type   Thrombocytopenia (H)   Vomiting without nausea, intractability of vomiting not specified, unspecified vomiting  type     I, Sarkis Castro, am serving as a trained medical scribe to document services personally performed by Sarkis Hoyt MD, based on the provider's statements to me.   I, Sarkis Hoyt MD, was physically present and have reviewed and verified the accuracy of this note documented by Sarkis Castro.    11/2/2018   [facility], [place], EMERGENCY DEPARTMENT     Sarkis Hoyt MD  11/02/18 1920

## 2023-09-26 NOTE — DISCHARGE INSTRUCTIONS
Please make an appointment to follow up with Your Primary Care Provider or our [facility] (phone: [phone]) in one week for recheck unless symptoms completely resolve.    Return to the ER for worsening or fever.  
no